# Patient Record
Sex: FEMALE | Race: WHITE | URBAN - METROPOLITAN AREA
[De-identification: names, ages, dates, MRNs, and addresses within clinical notes are randomized per-mention and may not be internally consistent; named-entity substitution may affect disease eponyms.]

---

## 2019-04-01 ENCOUNTER — OFFICE VISIT (OUTPATIENT)
Dept: URGENT CARE | Facility: URGENT CARE | Age: 49
End: 2019-04-01
Payer: COMMERCIAL

## 2019-04-01 VITALS
SYSTOLIC BLOOD PRESSURE: 142 MMHG | OXYGEN SATURATION: 96 % | DIASTOLIC BLOOD PRESSURE: 102 MMHG | TEMPERATURE: 99.2 F | HEART RATE: 82 BPM

## 2019-04-01 DIAGNOSIS — R03.0 ELEVATED BP WITHOUT DIAGNOSIS OF HYPERTENSION: ICD-10-CM

## 2019-04-01 DIAGNOSIS — R10.9 RIGHT FLANK PAIN: Primary | ICD-10-CM

## 2019-04-01 DIAGNOSIS — R10.11 RUQ ABDOMINAL PAIN: ICD-10-CM

## 2019-04-01 PROCEDURE — 99204 OFFICE O/P NEW MOD 45 MIN: CPT | Performed by: FAMILY MEDICINE

## 2019-04-01 RX ORDER — FLUTICASONE PROPIONATE 50 MCG
SPRAY, SUSPENSION (ML) NASAL
COMMUNITY
Start: 2019-01-31

## 2019-04-01 RX ORDER — MEDROXYPROGESTERONE ACETATE 10 MG
TABLET ORAL
COMMUNITY
Start: 2018-09-14

## 2019-04-01 RX ORDER — DIAZEPAM 2 MG
TABLET ORAL
COMMUNITY
Start: 2019-01-31

## 2019-04-01 RX ORDER — LISINOPRIL 10 MG/1
TABLET ORAL
COMMUNITY
Start: 2019-03-01

## 2019-04-02 NOTE — PROGRESS NOTES
SUBJECTIVE:    Chief Complaint   Patient presents with     Urgent Care     Musculoskeletal Problem     Rt middle back pain radiates through mid abdominal quadrant started today and had progressed, experiencing abdominal bloating. Denies diarrhea or constipation     HPI:  Janell Florez is a 48 year old female who presents with the CC of abdominal/ flank  pain.    Pain is located in the RUQ area, right flank  with radiation back to front.  The pain is characterized as stabbing and cramping,      Pain has been present for 1 day(s) and is slowly progressive.  EXACERBATING FACTORS: changing position  RELIEVING FACTORS: nothing.  ASSOCIATED SX: back pain.  Patient denies anorexia, nausea, vomiting, diarrhea, constipation, melena, hematochezia, dysuria, frequency, hematuria, fever and chills     Last time the patient passed stool- past day  Last time the patient urinated- today  Last time the patient was eating/ drinking -today, decreased    Surgical History :      Appendectomy   No  cholecystectomy    No   Colon or bowel surgery -  No  Diverticulitis history -   No  Endometriosis -  No  hysterectomy    No    PMH:  Hypertension    ALLERGIES:  Patient has no known allergies.      Current Outpatient Medications on File Prior to Visit:  diazepam (VALIUM) 2 MG tablet    fluticasone (FLONASE) 50 MCG/ACT nasal spray    lisinopril (PRINIVIL/ZESTRIL) 10 MG tablet    medroxyPROGESTERone (PROVERA) 10 MG tablet    omeprazole (PRILOSEC) 20 MG DR capsule      No current facility-administered medications on file prior to visit.     Social Hx:  No smoking    Family History:  Non-contributory,  No associated family health conditions    Review Of Systems    Constitutional:  Negative for fevers, chills,    Skin: negative for rash or lesions  Eyes: negative for eye pain, visual changes  Ears/Nose/Throat: negative for earache  , sinus trouble, persistent sore throat  Respiratory: No shortness of breath, dyspnea on exertion      Cardiovascular: negative for, palpitations, tachycardia and chest pain  Gastrointestinal: negative for vomiting,  and diarrhea  Genitourinary: negative for dysuria and hematuria  Back:   pain with back movement,   No CVA pain  Musculoskeletal: negative for generalized joint pain, joint swelling   Endocrine: negative for thyroid disorder and diabetes    OBJECTIVE:  BP (!) 142/102 (BP Location: Right arm, Patient Position: Chair, Cuff Size: Adult Large)   Pulse 82   Temp 99.2  F (37.3  C) (Tympanic)   SpO2 96%   GENERAL APPEARANCE: alert, moderate distress and cooperative  EYES: EOMI,  PERRL, conjunctiva clear  HENT: ear canals and TM's normal.  Nose and mouth without ulcers, erythema or lesions  NECK: supple, nontender, no lymphadenopathy  RESP: lungs clear to auscultation - no rales, rhonchi or wheezes  CV: regular rates and rhythm, normal S1 S2, no murmur noted  ABDOMEN: obese, soft, normal bowel sounds, tenderness marked RUQ  Pain with percussion,  About L3 level on back of right side- says pain makes radicular stripe to the abdomen  NEURO: Normal strength and tone, sensory exam grossly normal,  normal speech and mentation-  Walked on toes/ heels/ tandem walk without difficulty  SKIN: no suspicious lesions or rashes-  No shingles like lesions of back/ abdomen       ASSESSMENT:  Right flank pain  RUQ abdominal pain     We discussed some of the many possible causes of right flank/  abdominal pain including appendicitis, cholecystitis,   hepatitis, colitis, gastritis,  A stone in the gallbladder, pancreas, kidney or ureter, constipation, obstructed bowel, kidney or bladder infection,   UTI ,  Radicular nerve pain from the spine and shingles       We also discussed the limited capacity to evaluate these conditions in the urgent care,  that results would take more than a day for full labs and that UC has limited imaging options    Discussed she has significant RUQ pain that could have a biliary cause,  Her flank  pain could be associated with kidney infection or stone     [facility] , so [facility]  We discussed that she should be able to get all of her testing and imaging there-  She did not want any testing done at  (did not want potential repeat testing)      Elevated BP without diagnosis of hypertension  Blood pressure taken today was    elevated.     Frequent home/ store blood pressure checks are encouraged at different times of day.  Patient should keep a diary of blood pressure levels and share that information with the primary care provider.